# Patient Record
Sex: MALE | Race: OTHER | HISPANIC OR LATINO | Employment: OTHER | URBAN - METROPOLITAN AREA
[De-identification: names, ages, dates, MRNs, and addresses within clinical notes are randomized per-mention and may not be internally consistent; named-entity substitution may affect disease eponyms.]

---

## 2022-02-01 ENCOUNTER — HOSPITAL ENCOUNTER (EMERGENCY)
Facility: HOSPITAL | Age: 57
Discharge: HOME | End: 2022-02-01
Attending: EMERGENCY MEDICINE | Admitting: EMERGENCY MEDICINE
Payer: COMMERCIAL

## 2022-02-01 VITALS
TEMPERATURE: 97.8 F | SYSTOLIC BLOOD PRESSURE: 160 MMHG | BODY MASS INDEX: 29.55 KG/M2 | OXYGEN SATURATION: 97 % | WEIGHT: 223 LBS | HEART RATE: 59 BPM | RESPIRATION RATE: 18 BRPM | DIASTOLIC BLOOD PRESSURE: 78 MMHG | HEIGHT: 73 IN

## 2022-02-01 DIAGNOSIS — E11.9 DIABETES MELLITUS, NEW ONSET (HCC): Primary | ICD-10-CM

## 2022-02-01 LAB
ALBUMIN SERPL BCP-MCNC: 4.3 G/DL (ref 3.5–5)
ALP SERPL-CCNC: 55 U/L (ref 46–116)
ALT SERPL W P-5'-P-CCNC: 34 U/L (ref 12–78)
ANION GAP SERPL CALCULATED.3IONS-SCNC: 12 MMOL/L (ref 4–13)
AST SERPL W P-5'-P-CCNC: 22 U/L (ref 5–45)
BASOPHILS # BLD AUTO: 0.05 THOUSANDS/ΜL (ref 0–0.1)
BASOPHILS NFR BLD AUTO: 1 % (ref 0–1)
BILIRUB SERPL-MCNC: 0.7 MG/DL (ref 0.2–1)
BUN SERPL-MCNC: 14 MG/DL (ref 5–25)
CALCIUM SERPL-MCNC: 9.6 MG/DL (ref 8.3–10.1)
CHLORIDE SERPL-SCNC: 98 MMOL/L (ref 100–108)
CO2 SERPL-SCNC: 22 MMOL/L (ref 21–32)
CREAT SERPL-MCNC: 0.87 MG/DL (ref 0.6–1.3)
EOSINOPHIL # BLD AUTO: 0.1 THOUSAND/ΜL (ref 0–0.61)
EOSINOPHIL NFR BLD AUTO: 1 % (ref 0–6)
ERYTHROCYTE [DISTWIDTH] IN BLOOD BY AUTOMATED COUNT: 12 % (ref 11.6–15.1)
GFR SERPL CREATININE-BSD FRML MDRD: 97 ML/MIN/1.73SQ M
GLUCOSE SERPL-MCNC: 207 MG/DL (ref 65–140)
GLUCOSE SERPL-MCNC: 497 MG/DL (ref 65–140)
GLUCOSE SERPL-MCNC: >500 MG/DL (ref 65–140)
HCT VFR BLD AUTO: 42.5 % (ref 36.5–49.3)
HGB BLD-MCNC: 14.9 G/DL (ref 12–17)
IMM GRANULOCYTES # BLD AUTO: 0.05 THOUSAND/UL (ref 0–0.2)
IMM GRANULOCYTES NFR BLD AUTO: 1 % (ref 0–2)
LYMPHOCYTES # BLD AUTO: 1.45 THOUSANDS/ΜL (ref 0.6–4.47)
LYMPHOCYTES NFR BLD AUTO: 17 % (ref 14–44)
MCH RBC QN AUTO: 29.9 PG (ref 26.8–34.3)
MCHC RBC AUTO-ENTMCNC: 35.1 G/DL (ref 31.4–37.4)
MCV RBC AUTO: 85 FL (ref 82–98)
MONOCYTES # BLD AUTO: 0.56 THOUSAND/ΜL (ref 0.17–1.22)
MONOCYTES NFR BLD AUTO: 7 % (ref 4–12)
NEUTROPHILS # BLD AUTO: 6.27 THOUSANDS/ΜL (ref 1.85–7.62)
NEUTS SEG NFR BLD AUTO: 73 % (ref 43–75)
NRBC BLD AUTO-RTO: 0 /100 WBCS
PLATELET # BLD AUTO: 241 THOUSANDS/UL (ref 149–390)
PMV BLD AUTO: 9.9 FL (ref 8.9–12.7)
POTASSIUM SERPL-SCNC: 4.3 MMOL/L (ref 3.5–5.3)
PROT SERPL-MCNC: 7.4 G/DL (ref 6.4–8.2)
RBC # BLD AUTO: 4.99 MILLION/UL (ref 3.88–5.62)
SODIUM SERPL-SCNC: 132 MMOL/L (ref 136–145)
WBC # BLD AUTO: 8.48 THOUSAND/UL (ref 4.31–10.16)

## 2022-02-01 PROCEDURE — 96361 HYDRATE IV INFUSION ADD-ON: CPT

## 2022-02-01 PROCEDURE — 96374 THER/PROPH/DIAG INJ IV PUSH: CPT

## 2022-02-01 PROCEDURE — 36415 COLL VENOUS BLD VENIPUNCTURE: CPT

## 2022-02-01 PROCEDURE — 99284 EMERGENCY DEPT VISIT MOD MDM: CPT | Performed by: PHYSICIAN ASSISTANT

## 2022-02-01 PROCEDURE — 82948 REAGENT STRIP/BLOOD GLUCOSE: CPT

## 2022-02-01 PROCEDURE — 85025 COMPLETE CBC W/AUTO DIFF WBC: CPT | Performed by: EMERGENCY MEDICINE

## 2022-02-01 PROCEDURE — 99285 EMERGENCY DEPT VISIT HI MDM: CPT

## 2022-02-01 PROCEDURE — 80053 COMPREHEN METABOLIC PANEL: CPT | Performed by: EMERGENCY MEDICINE

## 2022-02-01 RX ADMIN — INSULIN HUMAN 5 UNITS: 100 INJECTION, SOLUTION PARENTERAL at 20:19

## 2022-02-01 RX ADMIN — SODIUM CHLORIDE 2000 ML: 0.9 INJECTION, SOLUTION INTRAVENOUS at 20:08

## 2022-02-02 NOTE — ED PROVIDER NOTES
History  Chief Complaint   Patient presents with    Hyperglycemia - Symptomatic     Pt states he went for blood work and resulted that his blood glucose was high (440)  New onset diabetic; increased urination, headache      56 yo male with hyperglycemia  Notes headache and polyuria for the last 1-2 weeks  His PCP ordered outpatient labs and found him to be hyperglycemia (400s)  He denies fever, chills, n/v  He has no h/o diabetes  His PCP recommended he go to ER for eval and also called him in metformin 500mg QD  History provided by:  Patient   used: No    Hyperglycemia - Symptomatic  Blood sugar level PTA:  400s  Severity:  Moderate  Onset quality:  Gradual  Duration:  1 week  Timing:  Constant  Progression:  Unchanged  Chronicity:  New  Diabetes status:  Non-diabetic  Relieved by:  Nothing  Ineffective treatments:  None tried  Associated symptoms: polyuria    Associated symptoms: no abdominal pain, no chest pain, no dysuria, no fever, no shortness of breath and no vomiting        None       History reviewed  No pertinent past medical history  Past Surgical History:   Procedure Laterality Date    ATRIAL CARDIAC PACEMAKER INSERTION         History reviewed  No pertinent family history  I have reviewed and agree with the history as documented  E-Cigarette/Vaping     E-Cigarette/Vaping Substances     Social History     Tobacco Use    Smoking status: Former Smoker    Smokeless tobacco: Never Used   Substance Use Topics    Alcohol use: Not Currently    Drug use: Not on file       Review of Systems   Constitutional: Negative for chills and fever  HENT: Negative for ear pain and sore throat  Eyes: Negative for pain and visual disturbance  Respiratory: Negative for cough and shortness of breath  Cardiovascular: Negative for chest pain and palpitations  Gastrointestinal: Negative for abdominal pain and vomiting  Endocrine: Positive for polyuria     Genitourinary: Positive for frequency  Negative for dysuria and hematuria  Musculoskeletal: Negative for arthralgias and back pain  Skin: Negative for color change and rash  Neurological: Positive for headaches  Negative for seizures and syncope  All other systems reviewed and are negative  Physical Exam  Physical Exam  Vitals and nursing note reviewed  Constitutional:       Appearance: He is well-developed  HENT:      Head: Normocephalic and atraumatic  Eyes:      Conjunctiva/sclera: Conjunctivae normal    Cardiovascular:      Rate and Rhythm: Normal rate and regular rhythm  Heart sounds: No murmur heard  Pulmonary:      Effort: Pulmonary effort is normal  No respiratory distress  Breath sounds: Normal breath sounds  Abdominal:      Palpations: Abdomen is soft  Tenderness: There is no abdominal tenderness  Musculoskeletal:      Cervical back: Neck supple  Skin:     General: Skin is warm and dry  Neurological:      Mental Status: He is alert and oriented to person, place, and time  GCS: GCS eye subscore is 4  GCS verbal subscore is 5  GCS motor subscore is 6  Comments: GCS 15  AAOx3  Ambulating in department without difficulty  CN II-XII grossly intact  No focal neuro deficits             Vital Signs  ED Triage Vitals   Temperature Pulse Respirations Blood Pressure SpO2   02/01/22 1620 02/01/22 1620 02/01/22 1620 02/01/22 1620 02/01/22 1620   97 8 °F (36 6 °C) 75 18 (!) 136/102 99 %      Temp Source Heart Rate Source Patient Position - Orthostatic VS BP Location FiO2 (%)   02/01/22 1620 02/01/22 1620 02/01/22 1620 02/01/22 1620 --   Oral Monitor Lying Left arm       Pain Score       02/01/22 2014       No Pain           Vitals:    02/01/22 1620 02/01/22 2024 02/01/22 2139   BP: (!) 136/102 140/84 160/78   Pulse: 75 62 59   Patient Position - Orthostatic VS: Lying Sitting Lying         Visual Acuity  Visual Acuity      Most Recent Value   L Pupil Size (mm) 4   R Pupil Size (mm) 4 ED Medications  Medications   sodium chloride 0 9 % bolus 2,000 mL (0 mL Intravenous Stopped 2/1/22 2208)   insulin regular (HumuLIN R,NovoLIN R) injection 5 Units (5 Units Intravenous Given 2/1/22 2019)       Diagnostic Studies  Results Reviewed     Procedure Component Value Units Date/Time    Fingerstick Glucose (POCT) [[id number]]  (Abnormal) Collected: 02/01/22 2130    Lab Status: Final result Updated: 02/01/22 2131     POC Glucose 207 mg/dl     Comprehensive metabolic panel [[id number]]  (Abnormal) Collected: 02/01/22 1618    Lab Status: Final result Specimen: Blood from Hand, Left Updated: 02/01/22 1646     Sodium 132 mmol/L      Potassium 4 3 mmol/L      Chloride 98 mmol/L      CO2 22 mmol/L      ANION GAP 12 mmol/L      BUN 14 mg/dL      Creatinine 0 87 mg/dL      Glucose 497 mg/dL      Calcium 9 6 mg/dL      AST 22 U/L      ALT 34 U/L      Alkaline Phosphatase 55 U/L      Total Protein 7 4 g/dL      Albumin 4 3 g/dL      Total Bilirubin 0 70 mg/dL      eGFR 97 ml/min/1 73sq m     Narrative:      [place] guidelines for Chronic Kidney Disease (CKD):     Stage 1 with normal or high GFR (GFR > 90 mL/min/1 73 square meters)    Stage 2 Mild CKD (GFR = 60-89 mL/min/1 73 square meters)    Stage 3A Moderate CKD (GFR = 45-59 mL/min/1 73 square meters)    Stage 3B Moderate CKD (GFR = 30-44 mL/min/1 73 square meters)    Stage 4 Severe CKD (GFR = 15-29 mL/min/1 73 square meters)    Stage 5 End Stage CKD (GFR <15 mL/min/1 73 square meters)  Note: GFR calculation is accurate only with a steady state creatinine    CBC and differential [[id number]] Collected: 02/01/22 1618    Lab Status: Final result Specimen: Blood from Hand, Left Updated: 02/01/22 1622     WBC 8 48 Thousand/uL      RBC 4 99 Million/uL      Hemoglobin 14 9 g/dL      Hematocrit 42 5 %      MCV 85 fL      MCH 29 9 pg      MCHC 35 1 g/dL      RDW 12 0 %      MPV 9 9 fL      Platelets 957 Thousands/uL      nRBC 0 /100 WBCs Neutrophils Relative 73 %      Immat GRANS % 1 %      Lymphocytes Relative 17 %      Monocytes Relative 7 %      Eosinophils Relative 1 %      Basophils Relative 1 %      Neutrophils Absolute 6 27 Thousands/µL      Immature Grans Absolute 0 05 Thousand/uL      Lymphocytes Absolute 1 45 Thousands/µL      Monocytes Absolute 0 56 Thousand/µL      Eosinophils Absolute 0 10 Thousand/µL      Basophils Absolute 0 05 Thousands/µL     Fingerstick Glucose (POCT) [[id number]]  (Abnormal) Collected: 02/01/22 1614    Lab Status: Final result Updated: 02/01/22 1614     POC Glucose >500 mg/dl                  No orders to display              Procedures  Procedures         ED Course                                             MDM  Number of Diagnoses or Management Options  Diabetes mellitus, new onset [facility]): new and requires workup  Diagnosis management comments: Differential diagnosis including but not limited to: DKA, hyperglycemia, metabolic abnormality, dehydration, sepsis, UTI, cardiac etiology, intracranial process, noncompliance with medications  Plan: Labs  R/o DKA  Fluids and low dose insulin         Amount and/or Complexity of Data Reviewed  Clinical lab tests: reviewed and ordered    Risk of Complications, Morbidity, and/or Mortality  Presenting problems: moderate  Management options: low  General comments: 54 yo with hyperglycemia  New onset diabetic  Glucose improved with fluids and insulin  Labs otherwise unremarkable  Not in dka  Recommend he continue the metformin his PCP started him on  F/u PCP  Return parameters provided  Pt understands and agrees with plan        Patient Progress  Patient progress: stable      Disposition  Final diagnoses:   Diabetes mellitus, new onset ([facility] [address] )     Time reflects when diagnosis was documented in both MDM as applicable and the Disposition within this note     Time User Action Codes Description Comment    2/1/2022  9:59 PM Kristina DICK Add [E11 9] Diabetes mellitus, new onset [facility])       ED Disposition     ED Disposition Condition Date/Time Comment    Discharge Stable Tue Feb 1, 2022  9:59 PM [facility] discharge to home/self care  Follow-up Information     Follow up With Specialties Details Why Contact Info Additional [address] [facility] Emergency Department Emergency Medicine Go to  If symptoms worsen [address] [place] [id number] [facility], [address] [place] [address], [facility]          There are no discharge medications for this patient  No discharge procedures on file      PDMP Review     None          ED Provider  Electronically Signed by           Foreign Hinds PA-C  02/02/22 2454

## 2022-04-28 ENCOUNTER — TELEPHONE (OUTPATIENT)
Dept: INTERNAL MEDICINE CLINIC | Facility: CLINIC | Age: 57
End: 2022-04-28